# Patient Record
Sex: FEMALE | URBAN - METROPOLITAN AREA
[De-identification: names, ages, dates, MRNs, and addresses within clinical notes are randomized per-mention and may not be internally consistent; named-entity substitution may affect disease eponyms.]

---

## 2020-06-27 ENCOUNTER — NURSE TRIAGE (OUTPATIENT)
Dept: NURSING | Facility: CLINIC | Age: 20
End: 2020-06-27

## 2020-06-27 ENCOUNTER — VIRTUAL VISIT (OUTPATIENT)
Dept: FAMILY MEDICINE | Facility: OTHER | Age: 20
End: 2020-06-27

## 2020-06-27 NOTE — PROGRESS NOTES
"Date: 2020 14:42:33  Clinician: Tala Schaeffer  Clinician NPI: [id number]  Patient: Apurva Ga  Patient : 2000  Patient Address: [address] [place] [address]  Patient Phone: [phone]  Visit Protocol: UTI  Patient Summary:  Apurva is a 20 year old ( : 2000 ) female who initiated a Visit for a presumed bladder infection. When asked the question \"Please sign me up to receive news, health information and promotions. \", Apurva responded \"Yes\".   Her symptoms started 1-3 days ago and consist of dysuria, foul-smelling urine, urinary frequency, and vaginal discharge.   Symptom details     Urine color: The color of her urine is yellow.     Vaginal discharge: The discharge is white in color and is chunky. The discharge is not typical for her.      Denied symptoms include nausea, feeling as if the bladder is never empty, flank pain, abdominal pain, chills, urinary urgency, urinary incontinence, vomiting, and vaginal itching. She does not feel feverish.   Over-the-counter medications or home remedies used to relieve the current symptoms as reported by the patient (free text): drank a lot of water and peed frequently   Precipitating events  Apurva denies having a sexually transmitted disease.  Pertinent medical history  Apurva has had a bladder infection before but has not had any in the past 12 months. Her current symptoms are similar to her previous bladder infection symptoms.   She is not sure what antibiotics have been effective in treating her past bladder infections.   Apurva typically gets a yeast infection when she takes antibiotics. She has used fluconazole (Diflucan) to treat previous yeast infections. She is not sure if she has needed 1 or 2 doses of fluconazole (Diflucan) for symptoms to resolve in the past.   Apurva has not been prescribed antibiotics to prevent frequent or repeated bladder infections in the past. She has not experienced problems or side effects with any of the " common antibiotics used to treat bladder infections.   Apurva does not have a history of kidney stones. She has not used a catheter or been a patient in a hospital or nursing home in the past 2 weeks.   Apurva does not smoke or use smokeless tobacco.   She denies pregnancy and denies breastfeeding. She has menstruated in the past month.   Additional information as reported by the patient (free text): Burning sensation while I pass urine, and saw a little bit of blood recently in the urine.     MEDICATIONS: No current medications, ALLERGIES: NKDA  Clinician Response:  Dear Apurva,   I am sorry you are not feeling well. To determine the most appropriate care for you, I would like you to be seen in person to further discuss your health history and symptoms.  You will not be charged for this Visit. Thank you for trusting us with your care.   Diagnosis: Refer for additional evaluation  Diagnosis ICD: R69

## 2020-06-27 NOTE — TELEPHONE ENCOUNTER
"\"I think I have a UTI. Several days ago I started having urinary pain and burning. I drank a lot of water sx seemed to get better. But today there's a small amount of blood in my urine and left lower back/flank pain. I do not think I have a fever, but I still have the burning when I urinate.\" Denies other sx. Triaged and advised ER. (pt does not have insurance, not able to go to ). Call back if needed.  Yolie Paniagua RN [facility]    COVID 19 Nurse Triage Plan/Patient Instructions    Please be aware that novel coronavirus (COVID-19) may be circulating in the community. If you develop symptoms such as fever, cough, or SOB or if you have concerns about the presence of another infection including coronavirus (COVID-19), please contact your health care provider or visit [email or web address].     Disposition/Instructions    Patient to go to ED and follow protocol based instructions.     Bring Your Own Device:  Please also bring your smart device(s) (smart phones, tablets, laptops) and their charging cables for your personal use and to communicate with your care team during your visit.    Thank you for taking steps to prevent the spread of this virus.  o Limit your contact with others.  o Wear a simple mask to cover your cough.  o Wash your hands well and often.    Resources    [facility]: About COVID-19: [email or web address]    CDC: What to Do If You're Sick: www.cdc.gov/coronavirus/2019-ncov/about/steps-when-sick.html    CDC: Ending Home Isolation: www.cdc.gov/coronavirus/2019-ncov/hcp/disposition-in-home-patients.html     CDC: Caring for Someone: www.cdc.gov/coronavirus/2019-ncov/if-you-are-sick/care-for-someone.html     [facility]: Interim Guidance for Hospital Discharge to Home: www.health.[facility].[place]/diseases/coronavirus/hcp/hospdischarge.pdf    [facility] clinical trials (COVID-19 research studies): clinicalaffairs.[facility]/n-clinical-trials     Below are the COVID-19 hotlines " at the [place] Department of [employer] ([facility]). Interpreters are available.   o For health questions: Call 133-291-9846 or 1-345.466.6265 (7 a.m. to 7 p.m.)  o For questions about schools and childcare: Call 094-487-8772 or 1-704.610.8379 (7 a.m. to 7 p.m.)                     Additional Information    Negative: [1] Unable to urinate (or only a few drops) > 4 hours AND     [2] bladder feels very full (e.g., palpable bladder or strong urge to urinate)    Negative: [1] Decreased urination and [2] drinking very little AND [2] dehydration suspected (e.g., dark urine, no urine > 12 hours, very dry mouth, very lightheaded)    Negative: Patient sounds very sick or weak to the triager    Negative: Fever > 100.5 F (38.1 C)    Side (flank) or lower back pain present    Protocols used: URINARY SYMPTOMS-A-AH

## 2020-06-27 NOTE — TELEPHONE ENCOUNTER
S: Possible bladder infection.  B: On 6/24 symptoms started.    Cloudy urine    Foul smelling    Burning when voiding    Today blood in urine    Today left side flank pain    Patient has been staying well hydrated by drinking plenty of water.   A: Advised patient to complete [email or web address] online form.   R: A provider will call back in 1-2 hours and complete a virtual phone visitr. Have a pharmacy chosen for the provider.        Additional Information    Negative: Shock suspected (e.g., cold/pale/clammy skin, too weak to stand, low BP, rapid pulse)    Negative: Sounds like a life-threatening emergency to the triager    Negative: [1] Unable to urinate (or only a few drops) > 4 hours AND     [2] bladder feels very full (e.g., palpable bladder or strong urge to urinate)    Negative: [1] Decreased urination and [2] drinking very little AND [2] dehydration suspected (e.g., dark urine, no urine > 12 hours, very dry mouth, very lightheaded)    Negative: Patient sounds very sick or weak to the triager    Negative: Fever > 100.5 F (38.1 C)    Side (flank) or lower back pain present    Protocols used: URINARY SYMPTOMS-A-AH    COVID 19 Nurse Triage Plan/Patient Instructions    Please be aware that novel coronavirus (COVID-19) may be circulating in the community. If you develop symptoms such as fever, cough, or SOB or if you have concerns about the presence of another infection including coronavirus (COVID-19), please contact your health care provider or visit [email or web address].     Disposition/Instructions    Patient to schedule a Virtual Visit with provider. Reference Visit Selection Guide.    Thank you for taking steps to prevent the spread of this virus.  o Limit your contact with others.  o Wear a simple mask to cover your cough.  o Wash your hands well and often.    Resources    [facility]: About COVID-19: [email or web address]    CDC: What to Do If You're Sick:  www.cdc.gov/coronavirus/2019-ncov/about/steps-when-sick.html    CDC: Ending Home Isolation: www.cdc.gov/coronavirus/2019-ncov/hcp/disposition-in-home-patients.html     CDC: Caring for Someone: www.cdc.gov/coronavirus/2019-ncov/if-you-are-sick/care-for-someone.html     [facility]: Interim Guidance for Hospital Discharge to Home: www.[facility].[place]./diseases/coronavirus/hcp/hospdischarge.pdf    [facility] clinical trials (COVID-19 research studies): clinicalaffairs.[facility]-clinical-trials     Below are the COVID-19 hotlines at the [place] Department of [facility] ([facility]). Interpreters are available.   o For health questions: Call 658-584-6091 or 1-669.813.3889 (7 a.m. to 7 p.m.)  o For questions about schools and childcare: Call 889-085-8746 or 1-995.571.9530 (7 a.m. to 7 p.m.)